# Patient Record
Sex: FEMALE | Race: BLACK OR AFRICAN AMERICAN
[De-identification: names, ages, dates, MRNs, and addresses within clinical notes are randomized per-mention and may not be internally consistent; named-entity substitution may affect disease eponyms.]

---

## 2019-11-10 ENCOUNTER — HOSPITAL ENCOUNTER (EMERGENCY)
Dept: HOSPITAL 61 - ER | Age: 39
Discharge: HOME | End: 2019-11-10
Payer: COMMERCIAL

## 2019-11-10 VITALS — BODY MASS INDEX: 23.54 KG/M2 | WEIGHT: 150 LBS | HEIGHT: 67 IN

## 2019-11-10 VITALS — SYSTOLIC BLOOD PRESSURE: 142 MMHG | DIASTOLIC BLOOD PRESSURE: 91 MMHG
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
## 2019-11-10 DIAGNOSIS — R51: ICD-10-CM

## 2019-11-10 DIAGNOSIS — S16.1XXA: Primary | ICD-10-CM
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
## 2019-11-10 DIAGNOSIS — J45.909: ICD-10-CM

## 2019-11-10 DIAGNOSIS — S29.012A: ICD-10-CM

## 2019-11-10 PROCEDURE — 72128 CT CHEST SPINE W/O DYE: CPT

## 2019-11-10 PROCEDURE — 72125 CT NECK SPINE W/O DYE: CPT

## 2019-11-10 PROCEDURE — 81025 URINE PREGNANCY TEST: CPT

## 2019-11-10 PROCEDURE — 70450 CT HEAD/BRAIN W/O DYE: CPT
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
